# Patient Record
Sex: MALE | Race: BLACK OR AFRICAN AMERICAN
[De-identification: names, ages, dates, MRNs, and addresses within clinical notes are randomized per-mention and may not be internally consistent; named-entity substitution may affect disease eponyms.]

---

## 2018-11-30 ENCOUNTER — HOSPITAL ENCOUNTER (EMERGENCY)
Dept: HOSPITAL 57 - BURERS | Age: 58
Discharge: HOME | End: 2018-11-30
Payer: MEDICAID

## 2018-11-30 DIAGNOSIS — J45.909: ICD-10-CM

## 2018-11-30 DIAGNOSIS — S63.501A: Primary | ICD-10-CM

## 2018-11-30 DIAGNOSIS — E78.5: ICD-10-CM
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
## 2018-11-30 DIAGNOSIS — I10: ICD-10-CM

## 2018-11-30 DIAGNOSIS — E11.9: ICD-10-CM

## 2018-11-30 NOTE — RAD
RIGHT WRIST THREE VIEWS:

 

Date: 11-30-18

 

FINDINGS: 

No fracture or acute bony change was seen. The carpal bones appeared normal. The metacarpals appear i
ntact. 

 

IMPRESSION: 

No acute finding.

 

POS: HOME

## 2022-08-01 ENCOUNTER — HOSPITAL ENCOUNTER (EMERGENCY)
Dept: HOSPITAL 57 - BURERS | Age: 62
LOS: 1 days | Discharge: HOME | End: 2022-08-02
Payer: COMMERCIAL

## 2022-08-01 DIAGNOSIS — E11.9: ICD-10-CM

## 2022-08-01 DIAGNOSIS — H92.02: Primary | ICD-10-CM

## 2022-08-01 DIAGNOSIS — Z79.84: ICD-10-CM

## 2022-08-01 DIAGNOSIS — I10: ICD-10-CM

## 2022-08-01 DIAGNOSIS — E78.5: ICD-10-CM

## 2022-08-01 PROCEDURE — 99282 EMERGENCY DEPT VISIT SF MDM: CPT

## 2023-10-17 ENCOUNTER — HOSPITAL ENCOUNTER (EMERGENCY)
Dept: HOSPITAL 57 - BURERS | Age: 63
Discharge: HOME | End: 2023-10-17
Payer: COMMERCIAL

## 2023-10-17 DIAGNOSIS — Z79.82: ICD-10-CM

## 2023-10-17 DIAGNOSIS — I10: ICD-10-CM

## 2023-10-17 DIAGNOSIS — Z79.899: ICD-10-CM

## 2023-10-17 DIAGNOSIS — H10.11: Primary | ICD-10-CM

## 2023-10-17 DIAGNOSIS — Z79.84: ICD-10-CM

## 2023-10-17 DIAGNOSIS — E78.5: ICD-10-CM

## 2023-10-17 DIAGNOSIS — E11.9: ICD-10-CM

## 2023-10-17 DIAGNOSIS — I25.10: ICD-10-CM

## 2023-10-17 PROCEDURE — 99282 EMERGENCY DEPT VISIT SF MDM: CPT
